# Patient Record
Sex: FEMALE | NOT HISPANIC OR LATINO | URBAN - METROPOLITAN AREA
[De-identification: names, ages, dates, MRNs, and addresses within clinical notes are randomized per-mention and may not be internally consistent; named-entity substitution may affect disease eponyms.]

---

## 2017-02-17 ENCOUNTER — APPOINTMENT (OUTPATIENT)
Dept: WOMENS IMAGING | Facility: HOSPITAL | Age: 47
End: 2017-02-17

## 2017-02-17 PROCEDURE — G0202 SCR MAMMO BI INCL CAD: HCPCS | Performed by: RADIOLOGY

## 2017-02-17 PROCEDURE — 77063 BREAST TOMOSYNTHESIS BI: CPT | Performed by: RADIOLOGY

## 2018-04-20 ENCOUNTER — APPOINTMENT (OUTPATIENT)
Dept: WOMENS IMAGING | Facility: HOSPITAL | Age: 48
End: 2018-04-20

## 2018-04-20 PROCEDURE — 77063 BREAST TOMOSYNTHESIS BI: CPT | Performed by: RADIOLOGY

## 2018-04-20 PROCEDURE — 77067 SCR MAMMO BI INCL CAD: CPT | Performed by: RADIOLOGY

## 2020-10-19 ENCOUNTER — OFFICE VISIT CONVERTED (OUTPATIENT)
Dept: ORTHOPEDIC SURGERY | Facility: CLINIC | Age: 50
End: 2020-10-19
Attending: ORTHOPAEDIC SURGERY

## 2020-11-23 ENCOUNTER — OFFICE VISIT CONVERTED (OUTPATIENT)
Dept: ORTHOPEDIC SURGERY | Facility: CLINIC | Age: 50
End: 2020-11-23
Attending: ORTHOPAEDIC SURGERY

## 2021-05-07 ENCOUNTER — APPOINTMENT (OUTPATIENT)
Dept: WOMENS IMAGING | Facility: HOSPITAL | Age: 51
End: 2021-05-07

## 2021-05-07 PROCEDURE — 77063 BREAST TOMOSYNTHESIS BI: CPT | Performed by: RADIOLOGY

## 2021-05-07 PROCEDURE — 77067 SCR MAMMO BI INCL CAD: CPT | Performed by: RADIOLOGY

## 2021-05-10 NOTE — H&P
History and Physical      Patient Name: Julia Anand   Patient ID: [id number]   Sex: Female   YOB: 1970        Visit Date: October 19, 2020    Provider: Jl Lomax MD   Location: [facility]   Location Address: [address] [place] [address]  [id number]   Location Phone: [phone]          Chief Complaint  · Right Knee Pain      History Of Present Illness  Julia Anand is a 50 year old female who presents today to [facility].      Patient presents today with a chief complaint of right knee pain. Patient states knee pain is mainly in the medial aspect of her knee and radiates to the bottom of her knee cap. Patient denies any trauma or injury to her left knee. Patient states that the pain has progressively gotten worse over the last several months. Patient states that she has been trying Tylenol arthritis and says that it has been helping her.       Medication List  acyclovir 200 mg oral capsule; aripiprazole 5 mg oral tablet; bupropion HCl 300 mg oral tablet extended release 24 hr; Fish Oil 120 mg-180 mg- 60 mg-1,200 mg oral capsule,delayed release(DR/EC); meloxicam 15 mg oral tablet; olmesartan 40 mg oral tablet; omeprazole 20 mg oral capsule,delayed release(DR/EC)         Allergy List  NO KNOWN DRUG ALLERGIES       Allergies Reconciled  Social History  Tobacco (Never)         Review of Systems  · Constitutional  o Denies  o : fever, chills, weight loss  · Cardiovascular  o Denies  o : chest pain, shortness of breath  · Gastrointestinal  o Denies  o : liver disease, heartburn, nausea, blood in stools  · Genitourinary  o Denies  o : painful urination, blood in urine  · Integument  o Denies  o : rash, itching  · Neurologic  o Denies  o : headache, weakness, loss of consciousness  · Musculoskeletal  o Denies  o : painful, swollen joints  · Psychiatric  o Denies  o : drug/alcohol addiction, anxiety, depression      Vitals  Date Time BP Position Site L\R Cuff Size HR RR TEMP  "(F) WT  HT  BMI kg/m2 BSA m2 O2 Sat FR L/min FiO2 HC       10/19/2020 12:48 PM         280lbs 0oz 5'  9\" 41.35 2.49             Physical Examination  · Constitutional  o Appearance  o : well developed, well-nourished, no obvious deformities present  · Head and Face  o Head  o :   § Inspection  § : normocephalic  o Face  o :   § Inspection  § : no facial lesions  · Eyes  o Conjunctivae  o : conjunctivae normal  o Sclerae  o : sclerae white  · Ears, Nose, Mouth and Throat  o Ears  o :   § External Ears  § : appearance within normal limits  § Hearing  § : intact  o Nose  o :   § External Nose  § : appearance normal  · Neck  o Inspection/Palpation  o : normal appearance  o Range of Motion  o : full range of motion  · Respiratory  o Respiratory Effort  o : breathing unlabored  o Inspection of Chest  o : normal appearance  o Auscultation of Lungs  o : no audible wheezing or rales  · Cardiovascular  o Heart  o : regular rate  · Gastrointestinal  o Abdominal Examination  o : soft and non-tender  · Skin and Subcutaneous Tissue  o General Inspection  o : intact, no rashes  · Psychiatric  o General  o : Alert and oriented x3  o Judgement and Insight  o : judgment and insight intact  o Mood and Affect  o : mood normal, affect appropriate  · Right Knee  o Inspection  o : Sensation grossly intact. Neurovascular intact. Pulses normal. Skin intact. Swelling. No skin discoloration o atrophy. Tender medial joint line. Mild tenderness of the lateral joint line. Full weight-bearing. Antalgic gait. Mild valgus deformity. Arthritic in appearance. Good strength in quadriceps, hamstrings, dorsiflexors, and plantar flexors. Calf supple, non-tender. Full flexion and extension.   · In Office Procedures  o View  o : LAT/SUNRISE/STANDING   o Site  o : right, knee  o Indication  o : Right knee pain  o Study  o : X-rays ordered, taken in the office, and reviewed today.  o Xray  o : Degenerative changes present that is consistent with " osteoarthritis.   · Imaging  o Imaging  o : [XRAY: [facility]] Tricompartmental joint space loss and spurring greater than typically seen at age 50. Findings are most consistent with osteoarthritis. No joint effusion or fracture.           Assessment  · Primary osteoarthritis of right knee     715.16/M17.11  · Right knee pain, unspecified chronicity     719.46/M25.561      Plan  · Orders  o Knee (Right) [facility] ([id number]) - 719.46/M25.561 - 10/19/2020  · Medications  o Medications have been Reconciled  o Transition of Care or Provider Policy  · Instructions  o Dr. Lomax saw and examined the patient and agrees with plan.   o X-rays reviewed by Dr. Lomax.  o Reviewed the patient's Past Medical, Social, and Family history as well as the ROS at today's visit, no changes.  o Call or return if worsening symptoms.  o Follow Up PRN.  o This note was transcribed by Daphne Walsh. mary  o Discussed diagnosis and treatment options with the patient. Discussed surgical intervention and injections. Patient states she wants to lose weight and knows she needs to lose some before considering surgical intervention to avoid being high risk. Patient denies an injection today. Patient will continue her conservative treatments and call back if she wants to get injections and isn't ready for surgical intervention.             Electronically Signed by: Daphne Walsh-, Other -Author on October 19, 2020 01:26:49 PM  Electronically Co-signed by: Jl Lomax MD -Reviewer on October 21, 2020 04:47:51 PM

## 2021-05-13 NOTE — PROGRESS NOTES
Progress Note      Patient Name: Julia Anand   Patient ID: [id number]   Sex: Female   YOB: 1970        Visit Date: November 23, 2020    Provider: Jl Lomax MD   Location: [facility]   Location Address: [address] [place] [address]  [id number]   Location Phone: [phone]          Chief Complaint  · right knee pain      History Of Present Illness  Julia Anand is a 50 year old female who presents today to [facility].      Patient presents today with a follow-up of right knee pain. Patient has pain on the medial aspect of her knee that radiates to the bottom of her knee cap. She denies any trauma or injury to her left knee. Patient states pain is tolerable but has progressively gotten worse overtime. She states she has been taking Tylenol arthritis that helps her significantly. Patient presents today wishing to discuss total knee replacement and how much weight she needs to loose.          Medication List  acyclovir 200 mg oral capsule; aripiprazole 5 mg oral tablet; bupropion HCl 300 mg oral tablet extended release 24 hr; Fish Oil 120 mg-180 mg- 60 mg-1,200 mg oral capsule,delayed release(DR/EC); meloxicam 15 mg oral tablet; olmesartan 40 mg oral tablet; omeprazole 20 mg oral capsule,delayed release(DR/EC)         Allergy List  NO KNOWN DRUG ALLERGIES       Allergies Reconciled  Social History  Alcohol (Never); Tobacco (Never)         Review of Systems  · Constitutional  o Denies  o : fever, chills, weight loss  · Cardiovascular  o Denies  o : chest pain, shortness of breath  · Gastrointestinal  o Denies  o : liver disease, heartburn, nausea, blood in stools  · Genitourinary  o Denies  o : painful urination, blood in urine  · Integument  o Denies  o : rash, itching  · Neurologic  o Denies  o : headache, weakness, loss of consciousness  · Musculoskeletal  o Denies  o : painful, swollen joints  · Psychiatric  o Denies  o : drug/alcohol addiction, anxiety,  "depression      Vitals  Date Time BP Position Site L\R Cuff Size HR RR TEMP (F) WT  HT  BMI kg/m2 BSA m2 O2 Sat FR L/min FiO2 HC       11/23/2020 01:26 PM         289lbs 6oz 5'  10\" 41.52 2.55             Physical Examination  · Constitutional  o Appearance  o : well developed, well-nourished, no obvious deformities present  · Head and Face  o Head  o :   § Inspection  § : normocephalic  o Face  o :   § Inspection  § : no facial lesions  · Eyes  o Conjunctivae  o : conjunctivae normal  o Sclerae  o : sclerae white  · Ears, Nose, Mouth and Throat  o Ears  o :   § External Ears  § : appearance within normal limits  § Hearing  § : intact  o Nose  o :   § External Nose  § : appearance normal  · Neck  o Inspection/Palpation  o : normal appearance  o Range of Motion  o : full range of motion  · Respiratory  o Respiratory Effort  o : breathing unlabored  o Inspection of Chest  o : normal appearance  o Auscultation of Lungs  o : no audible wheezing or rales  · Cardiovascular  o Heart  o : regular rate  · Gastrointestinal  o Abdominal Examination  o : soft and non-tender  · Skin and Subcutaneous Tissue  o General Inspection  o : intact, no rashes  · Psychiatric  o General  o : Alert and oriented x3  o Judgement and Insight  o : judgment and insight intact  o Mood and Affect  o : mood normal, affect appropriate  · Right Knee  o Inspection  o : Sensation grossly intact. Neurovascular intact. Skin intact. No swelling, skin discoloration or atrophy. Tender medial joint line. Mild tender of the lateral joint line. Full weightbearing. Antalgic gait. Arthritic appearing. Good strength in quadriceps, hamstrings, dorsiflexors, and plantar flexors. Calf supple, non-tender. Full flexion and extension. Negative Lachman. Negative Apley's. Negative Jonna's. Dorsal Pedal Pulse 2+, posterior tibialis pulse 2+. Mild varus deformity.   · Imaging  o Imaging  o : 10/7/20 [XRAY: [facility]] Tricompartmental joint space loss and " spurring greater than typically seen at age 50. Findings are most consistent with osteoarthritis. No joint effusion or fracture.           Assessment  · Primary osteoarthritis of right knee     715.16/M17.11  · Right knee pain, unspecified chronicity     719.46/M25.561      Plan  · Medications  o Medications have been Reconciled  o Transition of Care or Provider Policy  · Instructions  o Dr. Lomax saw and examined the patient and agrees with plan.   o X-rays reviewed by Dr. Lomax.  o Reviewed the patient's Past Medical, Social, and Family history as well as the ROS at today's visit, no changes.  o Call or return if worsening symptoms.  o Discussed surgery.  o Risks/benefits discussed with patient including, but not limited to: infection, bleeding, neurovascular damage, malunion, nonunion, aesthetic deformity, need for further surgery, and death.  o Discussed with patient the implant type being used during surgery and patient understands and desires to proceed.  o Surgery pamphlet given.  o Follow Up PRN.  o This note was transcribed by Daphne Walsh. mary  o Discussed diagnosis and treatment options with the patient. Patient is 289lbs with a BMI of 41, discussed losing 20-25lbs giving her BMI of 36 before proceeding with surgical intervention. Discussed getting under a BMI of 40 to reduce her risk of infection. Patient will try to lose 20lbs+ and continue her conservative treatment. She will follow-up when she reaches her goal. Patient denies an injection at this time.            Electronically Signed by: Daphne Walsh-, Other -Author on November 24, 2020 07:58:45 AM  Electronically Co-signed by: Jl Lomax MD -Reviewer on November 24, 2020 10:11:26 PM

## 2021-05-14 VITALS — WEIGHT: 289.37 LBS | HEIGHT: 70 IN | BODY MASS INDEX: 41.43 KG/M2

## 2021-05-14 VITALS — WEIGHT: 280 LBS | BODY MASS INDEX: 41.47 KG/M2 | HEIGHT: 69 IN
